# Patient Record
Sex: MALE
[De-identification: names, ages, dates, MRNs, and addresses within clinical notes are randomized per-mention and may not be internally consistent; named-entity substitution may affect disease eponyms.]

---

## 2018-10-01 ENCOUNTER — HOSPITAL ENCOUNTER (EMERGENCY)
Dept: HOSPITAL 97 - ER | Age: 38
Discharge: HOME | End: 2018-10-01
Payer: SELF-PAY

## 2018-10-01 DIAGNOSIS — S39.012A: Primary | ICD-10-CM

## 2018-10-01 DIAGNOSIS — Z91.010: ICD-10-CM

## 2018-10-01 DIAGNOSIS — S96.912A: ICD-10-CM
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
## 2018-10-01 PROCEDURE — 99284 EMERGENCY DEPT VISIT MOD MDM: CPT

## 2018-10-01 PROCEDURE — 72100 X-RAY EXAM L-S SPINE 2/3 VWS: CPT
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
